# Patient Record
Sex: MALE | Race: WHITE | NOT HISPANIC OR LATINO | URBAN - METROPOLITAN AREA
[De-identification: names, ages, dates, MRNs, and addresses within clinical notes are randomized per-mention and may not be internally consistent; named-entity substitution may affect disease eponyms.]

---

## 2020-07-17 ENCOUNTER — CONVERSION ENCOUNTER (OUTPATIENT)
Dept: SURGERY | Facility: CLINIC | Age: 30
End: 2020-07-17

## 2020-07-17 ENCOUNTER — OFFICE VISIT CONVERTED (OUTPATIENT)
Dept: UROLOGY | Facility: CLINIC | Age: 30
End: 2020-07-17
Attending: UROLOGY

## 2020-07-23 ENCOUNTER — HOSPITAL ENCOUNTER (OUTPATIENT)
Dept: PREADMISSION TESTING | Facility: HOSPITAL | Age: 30
Discharge: HOME | End: 2020-07-23
Attending: UROLOGY

## 2020-07-23 ENCOUNTER — HOSPITAL ENCOUNTER (OUTPATIENT)
Dept: SURGERY | Facility: CLINIC | Age: 30
Discharge: HOME | End: 2020-07-23
Attending: UROLOGY

## 2020-07-24 LAB — SARS-COV-2 RNA SPEC QL NAA+PROBE: NOT DETECTED

## 2020-07-25 LAB — BACTERIA UR CULT: NORMAL

## 2020-07-27 ENCOUNTER — HOSPITAL ENCOUNTER (OUTPATIENT)
Dept: PERIOP | Facility: HOSPITAL | Age: 30
Setting detail: HOSPITAL OUTPATIENT SURGERY
Discharge: HOME | End: 2020-07-27
Attending: UROLOGY

## 2020-07-27 LAB — GLUCOSE BLD-MCNC: 95 MG/DL (ref 70–99)

## 2020-08-12 ENCOUNTER — OFFICE VISIT CONVERTED (OUTPATIENT)
Dept: UROLOGY | Facility: CLINIC | Age: 30
End: 2020-08-12
Attending: UROLOGY

## 2020-08-20 ENCOUNTER — CONVERSION ENCOUNTER (OUTPATIENT)
Dept: SURGERY | Facility: CLINIC | Age: 30
End: 2020-08-20

## 2020-08-20 ENCOUNTER — OFFICE VISIT CONVERTED (OUTPATIENT)
Dept: UROLOGY | Facility: CLINIC | Age: 30
End: 2020-08-20
Attending: UROLOGY

## 2020-09-16 ENCOUNTER — OFFICE VISIT CONVERTED (OUTPATIENT)
Dept: UROLOGY | Facility: CLINIC | Age: 30
End: 2020-09-16
Attending: UROLOGY

## 2020-10-07 ENCOUNTER — OFFICE VISIT CONVERTED (OUTPATIENT)
Dept: UROLOGY | Facility: CLINIC | Age: 30
End: 2020-10-07
Attending: UROLOGY

## 2020-10-07 ENCOUNTER — CONVERSION ENCOUNTER (OUTPATIENT)
Dept: SURGERY | Facility: CLINIC | Age: 30
End: 2020-10-07

## 2020-10-21 ENCOUNTER — OFFICE VISIT CONVERTED (OUTPATIENT)
Dept: UROLOGY | Facility: CLINIC | Age: 30
End: 2020-10-21
Attending: UROLOGY

## 2020-11-25 ENCOUNTER — OFFICE VISIT CONVERTED (OUTPATIENT)
Dept: UROLOGY | Facility: CLINIC | Age: 30
End: 2020-11-25
Attending: UROLOGY

## 2021-05-10 NOTE — H&P
"   History and Physical      Patient Name: Tomy Thomason   Patient ID: [id number]   Sex: Male   YOB: 1990        Visit Date: July 17, 2020    Provider: Shabnam Conway MD   Location: [facility]   Location Address: [address] [place] [address]  [id number]   Location Phone: [phone]          Chief Complaint  · Outpatient History & Physical / Surgical Orders      History Of Present Illness  [facility]  Outpatient History and Physical Surgical Orders  Preadmission Location: Phone Preadmission Time: 02:00 PM   Which Facility: [facility] Surgery Date: 07/27/2020 Preadmission Testing Date: 07/23/2020   Patient's Name: Tomy Thomason YOB: 1990   Chief complaint/history present illness: phimosis   Current Medication List: metoprolol succinate 25 mg oral tablet extended release 24 hr   Allergies: NO KNOWN DRUG ALLERGIES   Significant past medical: High blood pressure   Past Surgical History: *I have had no surgeries   Examination of heart and lungs: Breath sounds normal, no distress, Abdomen soft, non-tender, BSx4 are positive, and Regular rate, no chest retraction         Allergy List    Allergies Reconciled  Vitals  Date Time BP Position Site L\R Cuff Size HR RR TEMP (F) WT  HT  BMI kg/m2 BSA m2 O2 Sat HC       07/17/2020 12:11 PM       12  264lbs 0oz 5'  10\" 37.88 2.43                   Assessment  · Pre-Surgical Orders     V72.84  · Pre-op testing     V72.84/Z01.818  · Phimosis     605/N47.1    Problems Reconciled  Plan  · Orders  o General Urology Surgery Order ([facility]) - V72.84, 605/N47.1 - 07/27/2020  o [facility] Pre-Op Covid-19 Screening (91666) - V72.84/Z01.818 - 07/23/2020  o Urine Culture (Clean Catch) [facility] (81379) - V72.84, 605/N47.1 - 07/23/2020  · Medications  o Medications have been Reconciled  o Transition of Care or Provider Policy  · Instructions  o Pre-Operative Orders: Sign permit for circumcision   o Outpatient   o MAC anesthesia  o Ancef " 2 gram IV OCTOR.  o RISK AND BENEFITS:  o Possible risks/complications, benefits and alternatives to surgical or invasive procedure have been explained to patient and/or legal guardian.  o Electronically Identified Patient Education Materials Provided Electronically            Electronically Signed by: Shabnam Conway MD -Author on July 17, 2020 03:48:32 PM

## 2021-05-10 NOTE — H&P
"   History and Physical      Patient Name: Tomy Thomason   Patient ID: [id number]   Sex: Male   YOB: 1990        Visit Date: July 17, 2020    Provider: Shabnam Conway MD   Location: [facility]   Location Address: [address] [place] [address]  [id number]   Location Phone: [phone]          Chief Complaint  · Pt here today for urological concerns      History Of Present Illness  The patient is a 29 year old /White male , who is self referred , for the evaluation of an unretractable foreskin. This has been a problem for 28 years. The problem is constant , very bothersome and it is stable. Now is having some pain with intercourse and a tearing sensations.   Voiding symptoms are absent. He does not have diabetes.   Prior treatments have not been tried. Denies h/o balanitis or balanopostathitis.       Past Medical History  High blood pressure         Past Surgical History  *I have had no surgeries         Medication List  metoprolol succinate 25 mg oral tablet extended release 24 hr         Allergy List  NO KNOWN DRUG ALLERGIES       Allergies Reconciled  Family Medical History  Diabetes, unspecified type; Family history of colon cancer         Social History  Tobacco (Never)         Review of Systems  · Constitutional  o Denies  o : chills, fever  · Cardiovascular  o Denies  o : chest pain on exertion  · Respiratory  o Denies  o : shortness of breath  · Gastrointestinal  o Denies  o : nausea, vomiting  · Genitourinary  o Denies  o : urgency with urine, urinary leakage  · Integument  o Denies  o : rash  · Neurologic  o Denies  o : tingling or numbness  · Musculoskeletal  o Denies  o : joint pain  · Endocrine  o Denies  o : weight gain, weight loss  · Heme-Lymph  o Denies  o : easy bleeding, easy bruising      Vitals  Date Time BP Position Site L\R Cuff Size HR RR TEMP (F) WT  HT  BMI kg/m2 BSA m2 O2 Sat HC       07/17/2020 12:11 PM       12  264lbs 0oz 5'  10\" 37.88 2.43   "         Physical Examination  · Constitutional  o Appearance  o : Well nourished, well developed patient in no acute distress. Ambulating without difficulty.  · Head and Face  o Head  o :   § Inspection  § : Normocephalic, atraumatic  o Face  o :   § Inspection  § : No facial lesions  · Respiratory  o Respiratory Effort  o : Breathing is unlabored without accessory muscle use  o Inspection of Chest  o : Normal appearance, no retractions  · Cardiovascular  o Heart  o : normal rate   · Gastrointestinal  o Abdominal Examination  o : appears soft and nondistended  · Genitourinary  o Penis  o : uncircumcised, no lesions present, no discharge, foreskin adhesions present; severe phimosis, unable to expose and visualize glans or meatus  · Lymphatic  o Neck  o : No lymphadenopathy present.  o Groin  o : No lymphadenopathy present  · Neurologic  o Mental Status Examination  o :   § Orientation  § : alert and oriented x 3  o Gait and Station  o :   § Gait Screening  § : Ambulating wiithout difficulty  · Psychiatric  o Mood and Affect  o : mood normal, affect appropriate              Assessment  · Phimosis     605/N47.1    Problems Reconciled  Plan  · Medications  o Medications have been Reconciled  o Transition of Care or Provider Policy  · Instructions  o DISCUSSION:  o I have explained to the patient the risks, benefits and alternatives to a circumcision. All questions were answered.  o He desires to proceed with the surgery.  o He understands that he is to avoid all sexual activity for 4-6 weeks post-op.  o PLAN:  o Schedule circumcision.  o Provided education materials related to upcoming procedure.  o Electronically Identified Patient Education Materials Provided Electronically            Electronically Signed by: Shabnam Conway MD -Author on July 17, 2020 12:42:10 PM

## 2021-05-13 NOTE — PROGRESS NOTES
Progress Note      Patient Name: Tomy Thomason   Patient ID: [id number]   Sex: Male   YOB: 1990    Primary Care Provider: Sp Pillai DO   Referring Provider: Sp Pillai DO    Visit Date: November 25, 2020    Provider: Shabnam Conway MD   Location: [facility]   Location Address: [address] [place] [address]  [id number]   Location Phone: [phone]          Chief Complaint  · pt here for urologic concerns      History Of Present Illness  The patient is a 29 year old /White male , who is self referred , for the evaluation of an unretractable foreskin. This has been a problem for 28 years. The problem is constant , very bothersome and it is stable. Now is having some pain with intercourse and a tearing sensations.   Voiding symptoms are absent. He does not have diabetes.   Prior treatments have not been tried. Denies h/o balanitis or balanopostathitis.      Update 8/12/2020: Patient presents for postop circumcision.  Patient states that he has severe pain and swelling.  Notes that it is slowly getting better.  Unable to walk appropriately or sit.  Has noted persistent slight ooze from his incision since returning from the OR.  Has been unable to eliminate or limit spontaneous erections particularly at night in the morning and patient has felt some tearing of the tissues with this.      Update 8/20/2020: Patient presents for one-week follow-up.  Has completed week of Keflex.  Did not like the Urojet on his wound.  Has noted decreased swelling as well as blood at his incision but it has not completely resolved.  Persistent pain slowly improving.    Update 9/16/2020: Patient presents for follow-up; still complains of edema; no further bruising.  Notes tenderness at the base of penis and mons pubis region.  No significant redness or discharge from incision.  Somewhat frustrated by prolonged healing.    Update 10/7/2020: Patient presents for follow-up after  "circumcision.  Complains of pain with partial erection; has been unable to get a full erection since surgery without pain; pain at incision site.  Bruising has resolved.    Update 10/21/2020: Patient presents for follow-up; has been using prescribed cream.  Believes that he has improved since last visit.  Still notes intermittent pain; unable to obtain a full erection due to discomfort.  Denies discharge from incision.    Update 11/25/2020: Presents for follow-up; stopped using the cream.  Notes persistent significant tenderness and lateral \"pulling\" sensation with manipulation particularly at the ventral incision.  Denies discharge or significant redness.  Notes significant personal stressors after recent break-up with girlfriend of 8 years.       Past Medical History  High blood pressure         Past Surgical History  Circumcision         Medication List  metoprolol succinate 25 mg oral tablet extended release 24 hr         Allergy List  NO KNOWN DRUG ALLERGIES       Allergies Reconciled  Family Medical History  Diabetes, unspecified type; Family history of colon cancer         Social History  Tobacco (Never)         Review of Systems  · Constitutional  o Denies  o : fever, chills  · Gastrointestinal  o Denies  o : nausea, vomiting      Vitals  Date Time BP Position Site L\R Cuff Size HR RR TEMP (F) WT  HT  BMI kg/m2 BSA m2 O2 Sat FR L/min FiO2 HC       11/25/2020 09:57 AM       16  264lbs 0oz 5'  10\" 37.88 2.43             Physical Examination  · Constitutional  o Appearance  o : Well developed, well nourished, alert, in no acute distress.  · Head and Face  o Head  o :   § Inspection  § : Normocephalic, atraumatic  o Face  o :   § Inspection  § : No facial lesions  · Respiratory  o Respiratory Effort  o : Breathing is unlabored without accessory muscle use  o Inspection of Chest  o : Normal appearance, no retractions  · Skin and Subcutaneous Tissue  o General Inspection  o : No rashes, lesions or areas of " "discoloration present. Skin turgor is normal.  · Neurologic  o Mental Status Examination  o :   § Orientation  § : alert and oriented x 3  o Gait and Station  o :   § Gait Screening  § : Ambulating wiithout difficulty  · Psychiatric  o Mood and Affect  o : mood normal, affect appropriate      exam: Resolved hematoma; no erythema or edema; no discharge; incision well-healed at this point improved from last examination; sensitive on examination               Assessment  · Phimosis     605/N47.1  · Circumcision complication, sequela     909.3/T81.9XXS    Problems Reconciled  Plan  · Medications  o Medications have been Reconciled  o Transition of Care or Provider Policy  · Instructions  o Electronically Identified Patient Education Materials Provided Electronically     Provided reassurance; postop issues now resolved; incision appears well-healed  Discussed sensitivity and tenderness with manipulation; this is common after circumcision in an adult male; likely more severe with him given his complicated postoperative course.  Anticipate slow resolution with time as he becomes accustomed to his new \"normal.  Continue light duty at work for 2 more weeks then able to perform duties as tolerated without restrictions.  Patient encouraged to follow-up as needed  All questions addressed             Electronically Signed by: Shabnam Conway MD -Author on November 25, 2020 10:24:04 AM  "

## 2021-05-13 NOTE — PROGRESS NOTES
Progress Note      Patient Name: Tomy Thomason   Patient ID: [id number]   Sex: Male   YOB: 1990    Primary Care Provider: Sp Pillai DO   Referring Provider: Sp Pillai DO    Visit Date: October 21, 2020    Provider: Shabnam Conway MD   Location: [facility]   Location Address: [address] [place] [address]  [id number]   Location Phone: [phone]          Chief Complaint  · pt here for urologic concerns      History Of Present Illness  The patient is a 29 year old /White male , who is self referred , for the evaluation of an unretractable foreskin. This has been a problem for 28 years. The problem is constant , very bothersome and it is stable. Now is having some pain with intercourse and a tearing sensations.   Voiding symptoms are absent. He does not have diabetes.   Prior treatments have not been tried. Denies h/o balanitis or balanopostathitis.      Update 8/12/2020: Patient presents for postop circumcision.  Patient states that he has severe pain and swelling.  Notes that it is slowly getting better.  Unable to walk appropriately or sit.  Has noted persistent slight ooze from his incision since returning from the OR.  Has been unable to eliminate or limit spontaneous erections particularly at night in the morning and patient has felt some tearing of the tissues with this.      Update 8/20/2020: Patient presents for one-week follow-up.  Has completed week of Keflex.  Did not like the Urojet on his wound.  Has noted decreased swelling as well as blood at his incision but it has not completely resolved.  Persistent pain slowly improving.    Update 9/16/2020: Patient presents for follow-up; still complains of edema; no further bruising.  Notes tenderness at the base of penis and mons pubis region.  No significant redness or discharge from incision.  Somewhat frustrated by prolonged healing.    Update 10/7/2020: Patient presents for follow-up after  "circumcision.  Complains of pain with partial erection; has been unable to get a full erection since surgery without pain; pain at incision site.  Bruising has resolved.    Update 10/21/2020: Patient presents for follow-up; has been using prescribed cream.  Believes that he has improved since last visit.  Still notes intermittent pain; unable to obtain a full erection due to discomfort.  Denies discharge from incision.       Past Medical History  High blood pressure         Past Surgical History  Circumcision         Medication List  metoprolol succinate 25 mg oral tablet extended release 24 hr; nystatin-triamcinolone 100,000-0.1 unit/g-% topical cream         Allergy List  NO KNOWN DRUG ALLERGIES       Allergies Reconciled  Family Medical History  Diabetes, unspecified type; Family history of colon cancer         Social History  Tobacco (Never)         Review of Systems  · Constitutional  o Denies  o : fever, chills  · Gastrointestinal  o Denies  o : nausea, vomiting      Vitals  Date Time BP Position Site L\R Cuff Size HR RR TEMP (F) WT  HT  BMI kg/m2 BSA m2 O2 Sat FR L/min FiO2 HC       10/21/2020 10:35 AM       17  263lbs 0oz 5'  10\" 37.74 2.43             Physical Examination  · Constitutional  o Appearance  o : Well developed, well nourished, alert, in no acute distress.  · Head and Face  o Head  o :   § Inspection  § : Normocephalic, atraumatic  o Face  o :   § Inspection  § : No facial lesions  · Respiratory  o Respiratory Effort  o : Breathing is unlabored without accessory muscle use  o Inspection of Chest  o : Normal appearance, no retractions  · Cardiovascular  o Heart  o : normal rate and rhythm   · Skin and Subcutaneous Tissue  o General Inspection  o : No rashes, lesions or areas of discoloration present. Skin turgor is normal.  · Neurologic  o Mental Status Examination  o :   § Orientation  § : alert and oriented x 3  o Gait and Station  o :   § Gait Screening  § : Ambulating wiithout " difficulty  · Psychiatric  o Mood and Affect  o : mood normal, affect appropriate      exam: Resolved hematoma; no ecchymoses; minimal edema of the penis; incision healing well, no discharge improved from last examination               Assessment  · Phimosis     605/N47.1  · Circumcision complication, sequela     909.3/T81.9XXS    Problems Reconciled  Plan  · Medications  o Medications have been Reconciled  o Transition of Care or Provider Policy  · Instructions  o Electronically Identified Patient Education Materials Provided Electronically     Continues to improve  Patient to continue to use prescribed cream x1 more week and then provided instructions on how to taper to off  Continue work restrictions as far as standing to decrease edema  Continue to keep incision clean and dry  Plan for follow-up in 1 month or earlier as needed  Work note provided  Question addressed             Electronically Signed by: Shabnam Conway MD -Author on October 21, 2020 11:04:48 AM

## 2021-05-13 NOTE — PROGRESS NOTES
Progress Note      Patient Name: Tomy Thomason   Patient ID: [id number]   Sex: Male   YOB: 1990    Primary Care Provider: Sp Pillai DO   Referring Provider: Sp Pillai DO    Visit Date: August 12, 2020    Provider: Shabnam Conway MD   Location: [facility]   Location Address: [address] [place] [address]  [id number]   Location Phone: [phone]          Chief Complaint  · Pt here today for urological concerns      History Of Present Illness  The patient is a 29 year old /White male , who is self referred , for the evaluation of an unretractable foreskin. This has been a problem for 28 years. The problem is constant , very bothersome and it is stable. Now is having some pain with intercourse and a tearing sensations.   Voiding symptoms are absent. He does not have diabetes.   Prior treatments have not been tried. Denies h/o balanitis or balanopostathitis.      Update 8/12/2020: Patient presents for postop circumcision.  Patient states that he has severe pain and swelling.  Notes that it is slowly getting better.  Unable to walk appropriately or sit.  Has noted persistent slight ooze from his incision since returning from the OR.  Has been unable to eliminate or limit spontaneous erections particularly at night in the morning and patient has felt some tearing of the tissues with this.       Past Medical History  High blood pressure         Past Surgical History  *I have had no surgeries         Medication List  bacitracin zinc 500 unit/gram topical ointment; Daypro 600 mg oral tablet; Keflex 500 mg oral capsule; metoprolol succinate 25 mg oral tablet extended release 24 hr         Allergy List  NO KNOWN DRUG ALLERGIES       Allergies Reconciled  Family Medical History  Diabetes, unspecified type; Family history of colon cancer         Social History  Tobacco (Never)         Review of Systems  · Constitutional  o Denies  o : chills,  "fever  · Gastrointestinal  o Denies  o : nausea, vomiting      Vitals  Date Time BP Position Site L\R Cuff Size HR RR TEMP (F) WT  HT  BMI kg/m2 BSA m2 O2 Sat HC       08/12/2020 11:46 AM       12  264lbs 0oz 5'  10\" 37.88 2.43           Physical Examination  · Constitutional  o Appearance  o : Well nourished, well developed patient in no acute distress.  · Eyes  o Conjunctivae  o : Conjunctivae normal  o Sclerae  o : Sclerae white  · Ears, Nose, Mouth and Throat  o Ears  o :   § Hearing  § : Intact to conversational voice both ears  § Ears  § : Normal  o Nose  o :   § External Nose  § : Appearance normal  · Neck  o Inspection/Palpation  o : Normal appearance, trachea midline  · Respiratory  o Respiratory Effort  o : Breathing unlabored without accessory muscle use  o Inspection of Chest  o : Normal appearance, no retractions  o Auscultation of Lungs  o : Normal breath sounds  · Cardiovascular  o Heart  o : Normal Rate  · Gastrointestinal  o Abdominal Examination  o : Appears soft and nondistended  o Hernias  o : No Hernias present  · Genitourinary  o Penis  o : Significant penile edema with small area of hematoma at the incision; not all stitches visible due to edema. No bilateral pedal edema  · Skin and Subcutaneous Tissue  o General Inspection  o : No rashes, lesions, or areas of discoloration present  o General Palpation  o : Skin Turgor Normal  · Neurologic  o Mental Status Examination  o :   § Orientation  § : Alert and Oriented X3  o Gait and Station  o :   § Gait Screening  § : Ambulating without difficulty  · Psychiatric  o Mood and Affect  o : Mood normal, affect appropriate              Assessment  · Phimosis     605/N47.1    Problems Reconciled  Plan  · Medications  o Medications have been Reconciled  o Transition of Care or Provider Policy  · Instructions  o Provided education materials related to upcoming procedure.  o Electronically Identified Patient Education Materials Provided Electronically   "   Provided reassurance.  Expect spontaneous resolution but will likely take some time to completely resolve.  Urojet lidocaine jelly provided to patient for symptom relief  Bacitracin refilled  500 mg Keflex twice daily x7 days  Trial of Daypro 300 mg twice daily  Side effects discussed    Follow-up next week for incision check  All questions addressed             Electronically Signed by: Shabnam Conway MD -Author on August 12, 2020 10:03:26 PM

## 2021-05-13 NOTE — PROGRESS NOTES
Progress Note      Patient Name: Tomy Thomason   Patient ID: [id number]   Sex: Male   YOB: 1990    Primary Care Provider: Sp Pillai DO   Referring Provider: Sp Pillai DO    Visit Date: August 20, 2020    Provider: Shabnam Conway MD   Location: [facility]   Location Address: [address] [place] [address]  [id number]   Location Phone: [phone]          Chief Complaint  · Patient is here for urological symptoms      History Of Present Illness  The patient is a 29 year old /White male , who is self referred , for the evaluation of an unretractable foreskin. This has been a problem for 28 years. The problem is constant , very bothersome and it is stable. Now is having some pain with intercourse and a tearing sensations.   Voiding symptoms are absent. He does not have diabetes.   Prior treatments have not been tried. Denies h/o balanitis or balanopostathitis.      Update 8/12/2020: Patient presents for postop circumcision.  Patient states that he has severe pain and swelling.  Notes that it is slowly getting better.  Unable to walk appropriately or sit.  Has noted persistent slight ooze from his incision since returning from the OR.  Has been unable to eliminate or limit spontaneous erections particularly at night in the morning and patient has felt some tearing of the tissues with this.      Update 8/20/2020: Patient presents for one-week follow-up.  Has completed week of Keflex.  Did not like the Urojet on his wound.  Has noted decreased swelling as well as blood at his incision but it has not completely resolved.  Persistent pain slowly improving.       Past Medical History  High blood pressure         Past Surgical History  Circumcision         Medication List  bacitracin zinc 500 unit/gram topical ointment; Daypro 600 mg oral tablet; metoprolol succinate 25 mg oral tablet extended release 24 hr         Allergy List  NO KNOWN DRUG ALLERGIES       Allergies  "Reconciled  Family Medical History  Diabetes, unspecified type; Family history of colon cancer         Social History  Tobacco (Never)         Review of Systems  · Constitutional  o Denies  o : fever, chills  · Gastrointestinal  o Denies  o : nausea, vomiting      Vitals  Date Time BP Position Site L\R Cuff Size HR RR TEMP (F) WT  HT  BMI kg/m2 BSA m2 O2 Sat HC       08/20/2020 11:01 AM       14  264lbs 0oz 5'  10\" 37.88 2.43           Physical Examination  · Constitutional  o Appearance  o : Well nourished, well developed patient in no acute distress.  · Eyes  o Conjunctivae  o : Conjunctivae normal  o Sclerae  o : Sclerae white  · Ears, Nose, Mouth and Throat  o Ears  o :   § Hearing  § : Intact to conversational voice both ears  § Ears  § : Normal  o Nose  o :   § External Nose  § : Appearance normal  · Neck  o Inspection/Palpation  o : Normal appearance, trachea midline  · Respiratory  o Respiratory Effort  o : Breathing unlabored without accessory muscle use  o Inspection of Chest  o : Normal appearance, no retractions  o Auscultation of Lungs  o : Normal breath sounds  · Cardiovascular  o Heart  o : Normal Rate  · Gastrointestinal  o Abdominal Examination  o : Appears soft and nondistended  · Skin and Subcutaneous Tissue  o General Inspection  o : No rashes, lesions, or areas of discoloration present  o General Palpation  o : Skin Turgor Normal  · Neurologic  o Mental Status Examination  o :   § Orientation  § : Alert and Oriented X3  o Gait and Station  o :   § Gait Screening  § : Ambulating without difficulty  · Psychiatric  o Mood and Affect  o : Mood normal, affect appropriate     , diffuse penile swelling with separation of his incision at the ventrum; no significant erythema; improved ecchymoses; no purulent discharge or odor               Assessment  · Phimosis     605/N47.1  · Circumcision complication, sequela     909.3/T81.9XXS    Problems Reconciled  Plan  · Medications  o Medications have been " Reconciled  o Transition of Care or Provider Policy  · Instructions  o Electronically Identified Patient Education Materials Provided Electronically     Postop circumcision wound dehiscence; improving  Refill 500 mg Keflex twice daily x7 days  As needed pain control, with ibuprofen  Okay to return to work light duty, 9/1/2020.  Work note provided  We will plan to see in 1 month for wound check and assessment if patient needs to continue light duty versus return to work unrestricted  Encouraged to schedule follow-up earlier with any issues   all questions addressed             Electronically Signed by: Shabnam Conway MD -Author on August 20, 2020 02:08:39 PM

## 2021-05-13 NOTE — PROGRESS NOTES
Progress Note      Patient Name: Tomy Thomason   Patient ID: [id number]   Sex: Male   YOB: 1990    Primary Care Provider: Sp Pillai DO   Referring Provider: Sp Pillai DO    Visit Date: September 16, 2020    Provider: Shabnam Conway MD   Location: [facility] [place] [place] [facility]   Location Address: [address] [place] [address]  [id number]   Location Phone: [phone]          Chief Complaint  · Patient is here for urological symptoms      History Of Present Illness  The patient is a 29 year old /White male , who is self referred , for the evaluation of an unretractable foreskin. This has been a problem for 28 years. The problem is constant , very bothersome and it is stable. Now is having some pain with intercourse and a tearing sensations.   Voiding symptoms are absent. He does not have diabetes.   Prior treatments have not been tried. Denies h/o balanitis or balanopostathitis.      Update 8/12/2020: Patient presents for postop circumcision.  Patient states that he has severe pain and swelling.  Notes that it is slowly getting better.  Unable to walk appropriately or sit.  Has noted persistent slight ooze from his incision since returning from the OR.  Has been unable to eliminate or limit spontaneous erections particularly at night in the morning and patient has felt some tearing of the tissues with this.      Update 8/20/2020: Patient presents for one-week follow-up.  Has completed week of Keflex.  Did not like the Urojet on his wound.  Has noted decreased swelling as well as blood at his incision but it has not completely resolved.  Persistent pain slowly improving.    Update 9/16/2020: Patient presents for follow-up; still complains of edema; no further bruising.  Notes tenderness at the base of penis and mons pubis region.  No significant redness or discharge from incision.  Somewhat frustrated by prolonged healing.       Past Medical History  High blood pressure  "        Past Surgical History  Circumcision         Medication List  metoprolol succinate 25 mg oral tablet extended release 24 hr         Allergy List  NO KNOWN DRUG ALLERGIES       Allergies Reconciled  Family Medical History  Diabetes, unspecified type; Family history of colon cancer         Social History  Tobacco (Never)         Review of Systems  · Constitutional  o Denies  o : chills  · Respiratory  o Denies  o : cough      Vitals  Date Time BP Position Site L\R Cuff Size HR RR TEMP (F) WT  HT  BMI kg/m2 BSA m2 O2 Sat        09/16/2020 08:14 AM       16  266lbs 0oz 5'  10\" 38.17 2.44           Physical Examination  · Constitutional  o Appearance  o : Well nourished, well developed patient in no acute distress.  · Eyes  o Conjunctivae  o : Conjunctivae normal  o Sclerae  o : Sclerae white  · Ears, Nose, Mouth and Throat  o Ears  o :   § Hearing  § : Intact to conversational voice both ears  § Ears  § : Normal  o Nose  o :   § External Nose  § : Appearance normal  · Neck  o Inspection/Palpation  o : Normal appearance, trachea midline  · Respiratory  o Respiratory Effort  o : Breathing unlabored without accessory muscle use  o Inspection of Chest  o : Normal appearance, no retractions  o Auscultation of Lungs  o : Normal breath sounds  · Cardiovascular  o Heart  o : Normal Rate  · Gastrointestinal  o Abdominal Examination  o : Appears soft and nondistended  · Skin and Subcutaneous Tissue  o General Inspection  o : No rashes, lesions, or areas of discoloration present  o General Palpation  o : Skin Turgor Normal  · Neurologic  o Mental Status Examination  o :   § Orientation  § : Alert and Oriented X3  o Gait and Station  o :   § Gait Screening  § : Ambulating without difficulty  · Psychiatric  o Mood and Affect  o : Mood normal, affect appropriate     , improving diffuse penile swelling with no significant separation of his incision, no erythema; no ecchymoses; no purulent discharge or odor "               Assessment  · Phimosis     605/N47.1  · Circumcision complication, sequela     909.3/T81.9XXS    Problems Reconciled  Plan  · Medications  o Medications have been Reconciled  o Transition of Care or Provider Policy  · Instructions  o Electronically Identified Patient Education Materials Provided Electronically     Provided reassurance, patient slowly improving, discussed physical exam findings  Continue light duty for 3 more weeks  Discussed incision care  Follow-up 3 weeks or earlier as needed  All questions addressed             Electronically Signed by: Shabnam Conway MD -Author on September 16, 2020 03:09:12 PM

## 2021-05-13 NOTE — PROGRESS NOTES
Progress Note      Patient Name: Tomy Thomason   Patient ID: [id number]   Sex: Male   YOB: 1990    Primary Care Provider: Sp Pillai DO   Referring Provider: Sp Pillai DO    Visit Date: October 7, 2020    Provider: Shabnam Conway MD   Location: [facility]   Location Address: [address] [place] [address]  [id number]   Location Phone: [phone]          Chief Complaint  · pt here for urologic concerns      History Of Present Illness  The patient is a 29 year old /White male , who is self referred , for the evaluation of an unretractable foreskin. This has been a problem for 28 years. The problem is constant , very bothersome and it is stable. Now is having some pain with intercourse and a tearing sensations.   Voiding symptoms are absent. He does not have diabetes.   Prior treatments have not been tried. Denies h/o balanitis or balanopostathitis.      Update 8/12/2020: Patient presents for postop circumcision.  Patient states that he has severe pain and swelling.  Notes that it is slowly getting better.  Unable to walk appropriately or sit.  Has noted persistent slight ooze from his incision since returning from the OR.  Has been unable to eliminate or limit spontaneous erections particularly at night in the morning and patient has felt some tearing of the tissues with this.      Update 8/20/2020: Patient presents for one-week follow-up.  Has completed week of Keflex.  Did not like the Urojet on his wound.  Has noted decreased swelling as well as blood at his incision but it has not completely resolved.  Persistent pain slowly improving.    Update 9/16/2020: Patient presents for follow-up; still complains of edema; no further bruising.  Notes tenderness at the base of penis and mons pubis region.  No significant redness or discharge from incision.  Somewhat frustrated by prolonged healing.    Update 10/7/2020: Patient presents for follow-up after  "circumcision.  Complains of pain with partial erection; has been unable to get a full erection since surgery without pain; pain at incision site.  Bruising has resolved.       Past Medical History  High blood pressure         Past Surgical History  Circumcision         Medication List  metoprolol succinate 25 mg oral tablet extended release 24 hr; nystatin-triamcinolone 100,000-0.1 unit/g-% topical cream         Allergy List  NO KNOWN DRUG ALLERGIES       Allergies Reconciled  Family Medical History  Diabetes, unspecified type; Family history of colon cancer         Social History  Tobacco (Never)         Review of Systems  · Constitutional  o Denies  o : fever, chills  · Gastrointestinal  o Denies  o : nausea, vomiting      Vitals  Date Time BP Position Site L\R Cuff Size HR RR TEMP (F) WT  HT  BMI kg/m2 BSA m2 O2 Sat FR L/min FiO2 HC       10/07/2020 11:01 AM       12  264lbs 2oz 5'  10\" 37.9 2.43             Physical Examination  · Constitutional  o Appearance  o : Well developed, well nourished, alert, in no acute distress.  · Head and Face  o Head  o :   § Inspection  § : Normocephalic, atraumatic  o Face  o :   § Inspection  § : No facial lesions  · Respiratory  o Respiratory Effort  o : Breathing is unlabored without accessory muscle use  o Inspection of Chest  o : Normal appearance, no retractions  · Cardiovascular  o Heart  o : normal rate and rhythm   · Skin and Subcutaneous Tissue  o General Inspection  o : No rashes, lesions or areas of discoloration present. Skin turgor is normal.  · Neurologic  o Mental Status Examination  o :   § Orientation  § : alert and oriented x 3  o Gait and Station  o :   § Gait Screening  § : Ambulating wiithout difficulty  · Psychiatric  o Mood and Affect  o : mood normal, affect appropriate      exam: Resolved hematoma; no ecchymoses; minimal edema of redundant preputial tissue which covers incision; mild tenderness with retraction; incision intact but in persistent state " of healing minimal erythema; no evidence of infection; mild glanular erythema, no warmth; blanches               Assessment  · Phimosis     605/N47.1  · Circumcision complication, sequela     909.3/T81.9XXS    Problems Reconciled  Plan  · Medications  o Medications have been Reconciled  o Transition of Care or Provider Policy  · Instructions  o Electronically Identified Patient Education Materials Provided Electronically     Continues slowly improving, discussed physical exam findings  Continue light duty  Trial of triamcinolone and nystatin cream to incision; discussed the importance of keeping it clean and dry  Follow-up 2 weeks for physical exam  All questions addressed             Electronically Signed by: Shabnam Conway MD -Author on October 7, 2020 12:00:19 PM

## 2021-05-14 VITALS — HEIGHT: 70 IN | BODY MASS INDEX: 37.81 KG/M2 | RESPIRATION RATE: 12 BRPM | WEIGHT: 264.12 LBS

## 2021-05-14 VITALS — HEIGHT: 70 IN | WEIGHT: 263 LBS | RESPIRATION RATE: 17 BRPM | BODY MASS INDEX: 37.65 KG/M2

## 2021-05-14 VITALS — WEIGHT: 266 LBS | RESPIRATION RATE: 16 BRPM | BODY MASS INDEX: 38.08 KG/M2 | HEIGHT: 70 IN

## 2021-05-14 VITALS — WEIGHT: 264 LBS | RESPIRATION RATE: 16 BRPM | BODY MASS INDEX: 37.8 KG/M2 | HEIGHT: 70 IN

## 2021-05-14 VITALS — BODY MASS INDEX: 37.8 KG/M2 | HEIGHT: 70 IN | WEIGHT: 264 LBS | RESPIRATION RATE: 14 BRPM

## 2021-05-15 VITALS — RESPIRATION RATE: 12 BRPM | BODY MASS INDEX: 37.8 KG/M2 | HEIGHT: 70 IN | WEIGHT: 264 LBS

## 2021-05-15 VITALS — WEIGHT: 264 LBS | HEIGHT: 70 IN | RESPIRATION RATE: 12 BRPM | BODY MASS INDEX: 37.8 KG/M2

## 2021-09-07 ENCOUNTER — PREP FOR SURGERY (OUTPATIENT)
Dept: OTHER | Facility: HOSPITAL | Age: 31
End: 2021-09-07